# Patient Record
Sex: MALE
[De-identification: names, ages, dates, MRNs, and addresses within clinical notes are randomized per-mention and may not be internally consistent; named-entity substitution may affect disease eponyms.]

---

## 2020-01-01 ENCOUNTER — RX ONLY (OUTPATIENT)
Age: 30
Setting detail: RX ONLY
End: 2020-01-01

## 2022-11-09 ENCOUNTER — HOSPITAL ENCOUNTER (EMERGENCY)
Dept: HOSPITAL 19 - COL.ER | Age: 32
Discharge: HOME | End: 2022-11-09
Attending: PERSONAL EMERGENCY RESPONSE ATTENDANT
Payer: SELF-PAY

## 2022-11-09 VITALS — SYSTOLIC BLOOD PRESSURE: 150 MMHG | HEART RATE: 73 BPM | DIASTOLIC BLOOD PRESSURE: 86 MMHG

## 2022-11-09 VITALS — HEIGHT: 74.02 IN | WEIGHT: 190.48 LBS | BODY MASS INDEX: 24.45 KG/M2

## 2022-11-09 VITALS — TEMPERATURE: 97.6 F

## 2022-11-09 DIAGNOSIS — R06.02: Primary | ICD-10-CM

## 2022-11-09 DIAGNOSIS — R07.89: ICD-10-CM

## 2022-11-09 DIAGNOSIS — Z28.310: ICD-10-CM

## 2022-11-09 LAB
ALBUMIN SERPL-MCNC: 4.2 GM/DL (ref 3.5–5)
ALP SERPL-CCNC: 105 U/L (ref 40–150)
ALT SERPL-CCNC: 16 U/L (ref 0–55)
ANION GAP SERPL CALC-SCNC: 14 MMOL/L (ref 7–16)
AST SERPL-CCNC: 22 U/L (ref 5–34)
BASOPHILS # BLD: 0.1 K/MM3 (ref 0–0.2)
BASOPHILS NFR BLD AUTO: 0.8 % (ref 0–2)
BILIRUB SERPL-MCNC: 0.7 MG/DL (ref 0.2–1.2)
BUN SERPL-MCNC: 6 MG/DL (ref 9–21)
CALCIUM SERPL-MCNC: 9.1 MG/DL (ref 8.4–10.2)
CHLORIDE SERPL-SCNC: 107 MMOL/L (ref 98–107)
CO2 SERPL-SCNC: 20 MMOL/L (ref 22–29)
CREAT SERPL-SCNC: 1.07 MG/DL (ref 0.72–1.25)
EOSINOPHIL # BLD: 0.3 K/MM3 (ref 0–0.7)
EOSINOPHIL NFR BLD: 3 % (ref 0–4)
ERYTHROCYTE [DISTWIDTH] IN BLOOD BY AUTOMATED COUNT: 12.6 % (ref 11.5–14.5)
GLUCOSE SERPL-MCNC: 96 MG/DL (ref 70–99)
GRANULOCYTES # BLD AUTO: 36.1 % (ref 42.2–75.2)
HCT VFR BLD AUTO: 41.4 % (ref 42–52)
HGB BLD-MCNC: 15.4 G/DL (ref 13.5–18)
LYMPHOCYTES # BLD: 4.3 K/MM3 (ref 1.2–3.4)
LYMPHOCYTES NFR BLD: 51.4 % (ref 20–51)
MCH RBC QN AUTO: 34 PG (ref 27–31)
MCHC RBC AUTO-ENTMCNC: 37 G/DL (ref 33–37)
MCV RBC AUTO: 90 FL (ref 80–100)
MONOCYTES # BLD: 0.7 K/MM3 (ref 0.1–0.6)
MONOCYTES NFR BLD AUTO: 8.3 % (ref 1.7–9.3)
NEUTROPHILS # BLD: 3 K/MM3 (ref 1.4–6.5)
PLATELET # BLD AUTO: 267 K/MM3 (ref 130–400)
PMV BLD AUTO: 8.7 FL (ref 7.4–10.4)
POTASSIUM SERPL-SCNC: 3.2 MMOL/L (ref 3.5–4.5)
PROT SERPL-MCNC: 8 GM/DL (ref 6.2–8.1)
RBC # BLD AUTO: 4.58 M/MM3 (ref 4.2–5.6)
SODIUM SERPL-SCNC: 141 MMOL/L (ref 136–145)
TROPONIN I SERPL-MCNC: < 0.01 NG/ML (ref 0–0.03)

## 2024-04-08 ENCOUNTER — HOSPITAL ENCOUNTER (EMERGENCY)
Dept: HOSPITAL 19 - COL.ER | Age: 34
Discharge: HOME | End: 2024-04-08
Attending: PHYSICIAN ASSISTANT
Payer: SELF-PAY

## 2024-04-08 VITALS — HEIGHT: 75 IN | BODY MASS INDEX: 23.05 KG/M2 | WEIGHT: 185.41 LBS

## 2024-04-08 VITALS — DIASTOLIC BLOOD PRESSURE: 93 MMHG | HEART RATE: 66 BPM | SYSTOLIC BLOOD PRESSURE: 123 MMHG

## 2024-04-08 VITALS — TEMPERATURE: 98 F

## 2024-04-08 DIAGNOSIS — Z87.891: ICD-10-CM

## 2024-04-08 DIAGNOSIS — R11.0: ICD-10-CM

## 2024-04-08 DIAGNOSIS — S60.552A: Primary | ICD-10-CM

## 2024-04-08 DIAGNOSIS — R10.9: ICD-10-CM
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
## 2024-04-08 DIAGNOSIS — R74.01: ICD-10-CM

## 2024-04-08 LAB
ALBUMIN SERPL-MCNC: 4.2 G/DL (ref 3.5–5)
ALP SERPL-CCNC: 99 U/L (ref 40–150)
ALT SERPL-CCNC: 40 U/L (ref 0–55)
ANION GAP SERPL CALC-SCNC: 15 MMOL/L (ref 7–16)
AST SERPL-CCNC: 41 U/L (ref 5–34)
BASOPHILS # BLD: 0.1 K/MM3 (ref 0–0.2)
BASOPHILS NFR BLD AUTO: 0.7 % (ref 0–2)
BILIRUB SERPL-MCNC: 0.9 MG/DL (ref 0.2–1.2)
BUN SERPL-MCNC: 5 MG/DL (ref 9–21)
CALCIUM SERPL-MCNC: 9.5 MG/DL (ref 8.4–10.2)
CHLORIDE SERPL-SCNC: 110 MEQ/L (ref 98–107)
CREAT SERPL-SCNC: 0.95 MG/DL (ref 0.72–1.25)
EOSINOPHIL # BLD: 0.1 K/MM3 (ref 0–0.7)
EOSINOPHIL NFR BLD: 1.2 % (ref 0–4)
ERYTHROCYTE [DISTWIDTH] IN BLOOD BY AUTOMATED COUNT: 13.6 % (ref 11.5–14.5)
GLUCOSE SERPL-MCNC: 97 MG/DL (ref 70–99)
GRANULOCYTES # BLD AUTO: 59 % (ref 42.2–75.2)
HCT VFR BLD AUTO: 42.8 % (ref 42–52)
HGB BLD-MCNC: 15.3 G/DL (ref 13.5–18)
LYMPHOCYTES # BLD: 2.2 K/MM3 (ref 1.2–3.4)
LYMPHOCYTES NFR BLD: 29.3 % (ref 20–51)
MCH RBC QN AUTO: 33 PG (ref 27–31)
MCHC RBC AUTO-ENTMCNC: 36 G/DL (ref 33–37)
MCV RBC AUTO: 93 FL (ref 80–100)
MONOCYTES # BLD: 0.7 K/MM3 (ref 0.1–0.6)
MONOCYTES NFR BLD AUTO: 9.4 % (ref 1.7–9.3)
NEUTROPHILS # BLD: 4.4 K/MM3 (ref 1.4–6.5)
PLATELET # BLD AUTO: 282 K/MM3 (ref 130–400)
PMV BLD AUTO: 9.1 FL (ref 7.4–10.4)
POTASSIUM SERPL-SCNC: 3.6 MEQ/L (ref 3.5–4.5)
PROT SERPL-MCNC: 7.6 G/DL (ref 6.2–8.1)
RBC # BLD AUTO: 4.62 M/MM3 (ref 4.2–5.6)
SODIUM SERPL-SCNC: 140 MEQ/L (ref 136–145)

## 2024-04-08 PROCEDURE — A4314 CATH W/DRAINAGE 2-WAY LATEX: HCPCS

## 2024-05-08 ENCOUNTER — HOSPITAL ENCOUNTER (EMERGENCY)
Dept: HOSPITAL 19 - COL.ER | Age: 34
End: 2024-05-08
Payer: SELF-PAY

## 2024-05-08 DIAGNOSIS — R10.11: Primary | ICD-10-CM

## 2024-05-08 DIAGNOSIS — F17.200: ICD-10-CM

## 2024-10-08 ENCOUNTER — APPOINTMENT (RX ONLY)
Dept: URBAN - METROPOLITAN AREA CLINIC 13 | Facility: CLINIC | Age: 34
Setting detail: DERMATOLOGY
End: 2024-10-08

## 2024-10-08 DIAGNOSIS — D49.2 NEOPLASM OF UNSPECIFIED BEHAVIOR OF BONE, SOFT TISSUE, AND SKIN: ICD-10-CM

## 2024-10-08 DIAGNOSIS — L81.4 OTHER MELANIN HYPERPIGMENTATION: ICD-10-CM

## 2024-10-08 DIAGNOSIS — D18.0 HEMANGIOMA: ICD-10-CM

## 2024-10-08 DIAGNOSIS — L57.8 OTHER SKIN CHANGES DUE TO CHRONIC EXPOSURE TO NONIONIZING RADIATION: ICD-10-CM

## 2024-10-08 DIAGNOSIS — Z71.89 OTHER SPECIFIED COUNSELING: ICD-10-CM

## 2024-10-08 DIAGNOSIS — L82.1 OTHER SEBORRHEIC KERATOSIS: ICD-10-CM

## 2024-10-08 DIAGNOSIS — D22 MELANOCYTIC NEVI: ICD-10-CM

## 2024-10-08 PROBLEM — D22.5 MELANOCYTIC NEVI OF TRUNK: Status: ACTIVE | Noted: 2024-10-08

## 2024-10-08 PROBLEM — D18.01 HEMANGIOMA OF SKIN AND SUBCUTANEOUS TISSUE: Status: ACTIVE | Noted: 2024-10-08

## 2024-10-08 PROCEDURE — 99203 OFFICE O/P NEW LOW 30 MIN: CPT | Mod: 25

## 2024-10-08 PROCEDURE — 11102 TANGNTL BX SKIN SINGLE LES: CPT

## 2024-10-08 PROCEDURE — ? BIOPSY BY SHAVE METHOD

## 2024-10-08 PROCEDURE — ? COUNSELING

## 2024-10-08 ASSESSMENT — LOCATION SIMPLE DESCRIPTION DERM
LOCATION SIMPLE: UPPER BACK
LOCATION SIMPLE: POSTERIOR NECK

## 2024-10-08 ASSESSMENT — LOCATION ZONE DERM
LOCATION ZONE: TRUNK
LOCATION ZONE: NECK

## 2024-10-08 ASSESSMENT — LOCATION DETAILED DESCRIPTION DERM
LOCATION DETAILED: INFERIOR THORACIC SPINE
LOCATION DETAILED: LEFT POSTERIOR NECK

## 2024-10-08 NOTE — PROCEDURE: BIOPSY BY SHAVE METHOD
Detail Level: Detailed
Depth Of Biopsy: dermis
Was A Bandage Applied: Yes
Size Of Lesion In Cm: 0.4
Biopsy Type: H and E
Biopsy Method: Personna blade
Anesthesia Type: 1% lidocaine with epinephrine
Anesthesia Volume In Cc: 0.5
Additional Anesthesia Volume In Cc (Will Not Render If 0): 0
Hemostasis: Carlos's
Wound Care: Petrolatum
Dressing: bandage
Destruction After The Procedure: No
Type Of Destruction Used: Curettage
Curettage Text: The wound bed was treated with curettage after the biopsy was performed.
Cryotherapy Text: The wound bed was treated with cryotherapy after the biopsy was performed.
Electrodesiccation Text: The wound bed was treated with electrodesiccation after the biopsy was performed.
Electrodesiccation And Curettage Text: The wound bed was treated with electrodesiccation and curettage after the biopsy was performed.
Silver Nitrate Text: The wound bed was treated with silver nitrate after the biopsy was performed.
Lab: -[phone]
Consent: Written consent was obtained and risks were reviewed including but not limited to scarring, infection, bleeding, scabbing, incomplete removal, nerve damage and allergy to anesthesia.
Post-Care Instructions: I reviewed with the patient in detail post-care instructions. Patient is to keep the biopsy site dry overnight, and then apply Vaseline to the area twice daily. Clean area with soap and water twice daily.
Notification Instructions: Patient will be notified of biopsy results. However, patient instructed to call the office if not contacted within 2 weeks.
Billing Type: Third-Party Bill
Information: Selecting Yes will display possible errors in your note based on the variables you have selected. This validation is only offered as a suggestion for you. PLEASE NOTE THAT THE VALIDATION TEXT WILL BE REMOVED WHEN YOU FINALIZE YOUR NOTE. IF YOU WANT TO FAX A PRELIMINARY NOTE YOU WILL NEED TO TOGGLE THIS TO 'NO' IF YOU DO NOT WANT IT IN YOUR FAXED NOTE.